# Patient Record
Sex: MALE | Race: BLACK OR AFRICAN AMERICAN | NOT HISPANIC OR LATINO | URBAN - METROPOLITAN AREA
[De-identification: names, ages, dates, MRNs, and addresses within clinical notes are randomized per-mention and may not be internally consistent; named-entity substitution may affect disease eponyms.]

---

## 2023-12-11 ENCOUNTER — EMERGENCY (EMERGENCY)
Facility: HOSPITAL | Age: 48
LOS: 0 days | Discharge: HOME | End: 2023-12-11
Attending: EMERGENCY MEDICINE
Payer: COMMERCIAL

## 2023-12-11 VITALS
OXYGEN SATURATION: 100 % | SYSTOLIC BLOOD PRESSURE: 176 MMHG | HEART RATE: 99 BPM | TEMPERATURE: 99 F | RESPIRATION RATE: 18 BRPM | WEIGHT: 210.1 LBS | DIASTOLIC BLOOD PRESSURE: 104 MMHG

## 2023-12-11 VITALS
RESPIRATION RATE: 18 BRPM | OXYGEN SATURATION: 98 % | HEART RATE: 82 BPM | DIASTOLIC BLOOD PRESSURE: 97 MMHG | SYSTOLIC BLOOD PRESSURE: 151 MMHG | TEMPERATURE: 98 F

## 2023-12-11 DIAGNOSIS — R07.81 PLEURODYNIA: ICD-10-CM

## 2023-12-11 DIAGNOSIS — S22.41XA MULTIPLE FRACTURES OF RIBS, RIGHT SIDE, INITIAL ENCOUNTER FOR CLOSED FRACTURE: ICD-10-CM

## 2023-12-11 DIAGNOSIS — R03.0 ELEVATED BLOOD-PRESSURE READING, WITHOUT DIAGNOSIS OF HYPERTENSION: ICD-10-CM
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
## 2023-12-11 PROCEDURE — 99284 EMERGENCY DEPT VISIT MOD MDM: CPT

## 2023-12-11 PROCEDURE — 99283 EMERGENCY DEPT VISIT LOW MDM: CPT

## 2023-12-11 RX ORDER — OXYCODONE AND ACETAMINOPHEN 5; 325 MG/1; MG/1
1 TABLET ORAL
Qty: 12 | Refills: 0
Start: 2023-12-11 | End: 2023-12-13

## 2023-12-11 NOTE — ED STATDOCS - ATTENDING APP SHARED VISIT CONTRIBUTION OF CARE
I personally saw the patient with the INDY, and completed the key components of the history and physical exam. I then discussed the management plan with the INDY.

## 2023-12-11 NOTE — ED STATDOCS - OBJECTIVE STATEMENT
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 Patient was seen at [facility] at that time, worked up with XR, CT, and blood work with findings for 2 rib fractures (7 and 8). Has been taking Tylenol/Motrin for pain without relief.  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 Patient was seen at [facility] at that time, worked up with XR, CT, and blood work with findings for 2 rib fractures (7 and 8). Has been taking Tylenol/Motrin for pain without relief.  Pharmacy: [address]

## 2023-12-11 NOTE — ED STATDOCS - CLINICAL SUMMARY MEDICAL DECISION MAKING FREE TEXT BOX
47 y/o male with 2 rib fractures after accident yesterday. Patient here for continued pain. Has inspirometer and antiinflammatories at home, but no narcotic pain medicine. Will treat with pain medication and followup with PMD.

## 2023-12-11 NOTE — ED STATDOCS - NSFOLLOWUPINSTRUCTIONS_ED_ALL_ED_FT
Rib Fracture    A rib fracture is a break or crack in one of the bones of the ribs. The ribs are long, curved bones that wrap around your chest and attach to your spine and your breastbone. The ribs protect your heart, lungs, and other organs in the chest.    A broken or cracked rib is often painful but is not usually serious. Most rib fractures heal on their own over time. However, rib fractures can be more serious if multiple ribs are broken or if broken ribs move out of place and push against other structures or organs.    What are the causes?  This condition is caused by:  Repetitive movements with high force, such as pitching a baseball or having very bad coughing spells.  A direct hit the chest, such as a sports injury, a car crash, or a fall.  Cancer that has spread to the bones, which can weaken bones and cause them to break.  What are the signs or symptoms?  Symptoms of this condition include:  Pain when you breathe in or cough.  Pain when someone presses on the injured area.  Feeling short of breath.  How is this diagnosed?  This condition is diagnosed with a physical exam and medical history. You may also have imaging tests, such as:  Chest X-ray.  CT scan.  MRI.  Bone scan.  Chest ultrasound.  How is this treated?  Treatment for this condition depends on the severity of the fracture. Most rib fractures usually heal on their own in 1–3 months. Healing may take longer if you have a cough or if you do activities that make the injury worse. While you heal, you may be given medicines to control the pain. You will also be taught deep breathing exercises.    Severe injuries may require hospitalization or surgery.    Follow these instructions at home:  Managing pain, stiffness, and swelling    If directed, put ice on the injured area. To do this:  Put ice in a plastic bag.  Place a towel between your skin and the bag.  Leave the ice on for 20 minutes, 2–3 times a day.  Remove the ice if your skin turns bright red. This is very important. If you cannot feel pain, heat, or cold, you have a greater risk of damage to the area.  Take over-the-counter and prescription medicines only as told by your health care provider.  Activity    Avoid doing activities or movements that cause pain. Be careful during activities and avoid bumping the injured rib.  Slowly increase your activity as told by your health care provider.  General instructions    Do deep breathing exercises as told by your health care provider. This helps prevent pneumonia, which is a common complication of a broken rib. Your health care provider may instruct you to:  Take deep breaths several times a day.  Try to cough several times a day, holding a pillow against the injured area.  Use a device called incentive spirometer to practice deep breathing several times a day.  Drink enough fluid to keep your urine pale yellow.  Do not wear a rib belt or binder. These restrict breathing, which can lead to pneumonia.  Keep all follow-up visits. This is important.  Contact a health care provider if:  You have a fever.  Get help right away if:  You have difficulty breathing or you are short of breath.  You develop a cough that does not stop, or you cough up thick or bloody sputum.  You have nausea, vomiting, or pain in your abdomen.  Your pain gets worse and medicine does not help.  These symptoms may represent a serious problem that is an emergency. Do not wait to see if the symptoms will go away. Get medical help right away. Call your local emergency services (911 in the [place] Do not drive yourself to the hospital.    Summary  A rib fracture is a break or crack in one of the bones of the ribs.  A broken or cracked rib is often painful but is not usually serious.  Most rib fractures heal on their own over time.  Treatment for this condition depends on the severity of the fracture.  Avoid doing any activities or movements that cause pain.  This information is not intended to replace advice given to you by your health care provider. Make sure you discuss any questions you have with your health care provider.    Document Revised: 04/09/2021 Document Reviewed: 04/09/2021  [employer] Patient Education © 2023 [employer]  [employer] logo  Terms and Conditions  Privacy Policy  Editorial Policy  All content on this site: Copyright © 2023 [employer], its licensors, and contributors. All rights are reserved, including those for text and data mining, AI training, and similar technologies. For all open access content, the Creative Commons licensing terms apply.  Cookies are used by this site. To decline or le Rib Fracture    A rib fracture is a break or crack in one of the bones of the ribs. The ribs are long, curved bones that wrap around your chest and attach to your spine and your breastbone. The ribs protect your heart, lungs, and other organs in the chest.    A broken or cracked rib is often painful but is not usually serious. Most rib fractures heal on their own over time. However, rib fractures can be more serious if multiple ribs are broken or if broken ribs move out of place and push against other structures or organs.    What are the causes?  This condition is caused by:  Repetitive movements with high force, such as pitching a baseball or having very bad coughing spells.  A direct hit the chest, such as a sports injury, a car crash, or a fall.  Cancer that has spread to the bones, which can weaken bones and cause them to break.  What are the signs or symptoms?  Symptoms of this condition include:  Pain when you breathe in or cough.  Pain when someone presses on the injured area.  Feeling short of breath.  How is this diagnosed?  This condition is diagnosed with a physical exam and medical history. You may also have imaging tests, such as:  Chest X-ray.  CT scan.  MRI.  Bone scan.  Chest ultrasound.  How is this treated?  Treatment for this condition depends on the severity of the fracture. Most rib fractures usually heal on their own in 1–3 months. Healing may take longer if you have a cough or if you do activities that make the injury worse. While you heal, you may be given medicines to control the pain. You will also be taught deep breathing exercises.    Severe injuries may require hospitalization or surgery.    Follow these instructions at home:  Managing pain, stiffness, and swelling    If directed, put ice on the injured area. To do this:  Put ice in a plastic bag.  Place a towel between your skin and the bag.  Leave the ice on for 20 minutes, 2–3 times a day.  Remove the ice if your skin turns bright red. This is very important. If you cannot feel pain, heat, or cold, you have a greater risk of damage to the area.  Take over-the-counter and prescription medicines only as told by your health care provider.  Activity    Avoid doing activities or movements that cause pain. Be careful during activities and avoid bumping the injured rib.  Slowly increase your activity as told by your health care provider.  General instructions    Do deep breathing exercises as told by your health care provider. This helps prevent pneumonia, which is a common complication of a broken rib. Your health care provider may instruct you to:  Take deep breaths several times a day.  Try to cough several times a day, holding a pillow against the injured area.  Use a device called incentive spirometer to practice deep breathing several times a day.  Drink enough fluid to keep your urine pale yellow.  Do not wear a rib belt or binder. These restrict breathing, which can lead to pneumonia.  Keep all follow-up visits. This is important.  Contact a health care provider if:  You have a fever.  Get help right away if:  You have difficulty breathing or you are short of breath.  You develop a cough that does not stop, or you cough up thick or bloody sputum.  You have nausea, vomiting, or pain in your abdomen.  Your pain gets worse and medicine does not help.  These symptoms may represent a serious problem that is an emergency. Do not wait to see if the symptoms will go away. Get medical help right away. Call your local emergency services (911 in the [place] Do not drive yourself to the hospital.    Summary  A rib fracture is a break or crack in one of the bones of the ribs.  A broken or cracked rib is often painful but is not usually serious.  Most rib fractures heal on their own over time.  Treatment for this condition depends on the severity of the fracture.  Avoid doing any activities or movements that cause pain.  This information is not intended to replace advice given to you by your health care provider. Make sure you discuss any questions you have with your health care provider.    Document Revised: 04/09/2021 Document Reviewed: 04/09/2021  [employer] Patient Education © 2023 [employer]  [employer] logo  Terms and Conditions  Privacy Policy  Editorial Policy  All content on this site: Copyright © 2023 [employer], its licensors, and contributors. All rights are reserved, including those for text and data mining, AI training, and similar technologies. For all open access content, the Creative Commons licensing terms apply.  Cookies are used by this site. To decline or le

## 2023-12-11 NOTE — ED STATDOCS - PATIENT PORTAL LINK FT
You can access the FollowMyHealth Patient Portal offered by [facility] by registering at the following website: [email or web address]. By joining [employer]’s FollowMyHealth portal, you will also be able to view your health information using other applications (apps) compatible with our system. You can access the FollowMyHealth Patient Portal offered by [facility] by registering at the following website: [email or web address]. By joining [employer]’s FollowMyHealth portal, you will also be able to view your health information using other applications (apps) compatible with our system.

## 2023-12-11 NOTE — ED STATDOCS - PROGRESS NOTE DETAILS
48-year-old male with no PMHx presents the ED complaining of needing pain medication for 2 right rib fractures pt sustained in an injury yesterday.   patient was initially evaluated with CT imaging at [facility], but the patient reports that prescription system was down and was unable to have pain medication sent to pharmacy.   Patient continuing to have right sided chest pain.   No shortness of breath, no fevers, no chills.  pain medication ordered in ED then we will repeat blood pressure and plan to dispo home with oxycodone for pain relief.  Meagan Martins PA-C 48-year-old male with no PMHx presents the ED complaining of needing pain medication for 2 right rib fractures pt sustained in an injury yesterday.   patient was initially evaluated with CT imaging at [facility], but the patient reports that prescription system was down and was unable to have pain medication sent to pharmacy.   Patient continuing to have right sided chest pain.   No shortness of breath, no fevers, no chills.  pain medication ordered in ED then we will repeat blood pressure and plan to dispo home with oxycodone for pain relief.  Meagan Martins PA-C

## 2023-12-11 NOTE — ED ADULT TRIAGE NOTE - CHIEF COMPLAINT QUOTE
Pt presents to ED c/o rib pain after hitting his right side of the rib with metal rods yesterday. Denies shortness of breath and chest pain at this time.

## 2023-12-11 NOTE — ED STATDOCS - CARE PLAN
Principal Discharge DX:	Multiple fractures of ribs of right side  Secondary Diagnosis:	Elevated blood pressure reading   1

## 2023-12-11 NOTE — ED ADULT NURSE NOTE - NSFALLUNIVINTERV_ED_ALL_ED
Bed/Stretcher in lowest position, wheels locked, appropriate side rails in place/Call bell, personal items and telephone in reach/Instruct patient to call for assistance before getting out of bed/chair/stretcher/Non-slip footwear applied when patient is off stretcher/[place] to call system/Physically safe environment - no spills, clutter or unnecessary equipment/Purposeful proactive rounding/Room/bathroom lighting operational, light cord in reach Bed/Stretcher in lowest position, wheels locked, appropriate side rails in place/Call bell, personal items and telephone in reach/Instruct patient to call for assistance before getting out of bed/chair/stretcher/Non-slip footwear applied when patient is off stretcher/[place] to call system/Physically safe environment - no spills, clutter or unnecessary equipment/Purposeful proactive rounding/Room/bathroom lighting operational, light cord in reach

## 2023-12-11 NOTE — ED STATDOCS - NS ED ATTENDING STATEMENT MOD
This was a shared visit with the INDY. I reviewed and verified the documentation and independently performed the documented: